# Patient Record
Sex: MALE | Race: WHITE | Employment: FULL TIME | URBAN - NONMETROPOLITAN AREA
[De-identification: names, ages, dates, MRNs, and addresses within clinical notes are randomized per-mention and may not be internally consistent; named-entity substitution may affect disease eponyms.]

---

## 2017-07-13 ENCOUNTER — OFFICE VISIT (OUTPATIENT)
Age: 22
End: 2017-07-13

## 2017-07-13 VITALS
HEART RATE: 97 BPM | SYSTOLIC BLOOD PRESSURE: 134 MMHG | RESPIRATION RATE: 18 BRPM | BODY MASS INDEX: 27.19 KG/M2 | HEIGHT: 66 IN | WEIGHT: 169.2 LBS | DIASTOLIC BLOOD PRESSURE: 66 MMHG | TEMPERATURE: 98 F | OXYGEN SATURATION: 98 %

## 2017-07-13 DIAGNOSIS — R10.31 RIGHT GROIN PAIN: ICD-10-CM

## 2017-07-13 DIAGNOSIS — K40.90 LEFT INGUINAL HERNIA: ICD-10-CM

## 2017-07-13 DIAGNOSIS — K40.90 RIGHT INGUINAL HERNIA: Primary | ICD-10-CM

## 2017-07-13 PROCEDURE — 99203 OFFICE O/P NEW LOW 30 MIN: CPT | Performed by: SURGERY

## 2017-07-13 PROCEDURE — G8419 CALC BMI OUT NRM PARAM NOF/U: HCPCS | Performed by: SURGERY

## 2017-07-13 PROCEDURE — G8427 DOCREV CUR MEDS BY ELIG CLIN: HCPCS | Performed by: SURGERY

## 2017-07-13 PROCEDURE — 1036F TOBACCO NON-USER: CPT | Performed by: SURGERY

## 2017-07-13 ASSESSMENT — ENCOUNTER SYMPTOMS
COUGH: 0
APNEA: 0
NAUSEA: 0
ABDOMINAL PAIN: 0

## 2017-07-19 ENCOUNTER — ANESTHESIA EVENT (OUTPATIENT)
Dept: OPERATING ROOM | Age: 22
End: 2017-07-19
Payer: COMMERCIAL

## 2017-07-19 ENCOUNTER — ANESTHESIA (OUTPATIENT)
Dept: OPERATING ROOM | Age: 22
End: 2017-07-19
Payer: COMMERCIAL

## 2017-07-19 ENCOUNTER — HOSPITAL ENCOUNTER (OUTPATIENT)
Age: 22
Setting detail: OUTPATIENT SURGERY
Discharge: HOME | End: 2017-07-19
Attending: SURGERY | Admitting: SURGERY
Payer: COMMERCIAL

## 2017-07-19 VITALS
HEART RATE: 104 BPM | WEIGHT: 161.8 LBS | HEIGHT: 66 IN | TEMPERATURE: 98.1 F | SYSTOLIC BLOOD PRESSURE: 139 MMHG | OXYGEN SATURATION: 98 % | RESPIRATION RATE: 17 BRPM | DIASTOLIC BLOOD PRESSURE: 78 MMHG | BODY MASS INDEX: 26 KG/M2

## 2017-07-19 VITALS
RESPIRATION RATE: 7 BRPM | OXYGEN SATURATION: 98 % | TEMPERATURE: 14.4 F | SYSTOLIC BLOOD PRESSURE: 128 MMHG | DIASTOLIC BLOOD PRESSURE: 74 MMHG

## 2017-07-19 PROCEDURE — 93005 ELECTROCARDIOGRAM TRACING: CPT

## 2017-07-19 PROCEDURE — 3700000001 HC ADD 15 MINUTES (ANESTHESIA): Performed by: SURGERY

## 2017-07-19 PROCEDURE — 7100000001 HC PACU RECOVERY - ADDTL 15 MIN: Performed by: SURGERY

## 2017-07-19 PROCEDURE — 6360000002 HC RX W HCPCS: Performed by: NURSE ANESTHETIST, CERTIFIED REGISTERED

## 2017-07-19 PROCEDURE — 2500000003 HC RX 250 WO HCPCS: Performed by: SURGERY

## 2017-07-19 PROCEDURE — 3600000009 HC SURGERY ROBOT BASE: Performed by: SURGERY

## 2017-07-19 PROCEDURE — 3700000000 HC ANESTHESIA ATTENDED CARE: Performed by: SURGERY

## 2017-07-19 PROCEDURE — 7100000011 HC PHASE II RECOVERY - ADDTL 15 MIN: Performed by: SURGERY

## 2017-07-19 PROCEDURE — 2580000003 HC RX 258: Performed by: NURSE ANESTHETIST, CERTIFIED REGISTERED

## 2017-07-19 PROCEDURE — C1781 MESH (IMPLANTABLE): HCPCS | Performed by: SURGERY

## 2017-07-19 PROCEDURE — 2500000003 HC RX 250 WO HCPCS: Performed by: NURSE ANESTHETIST, CERTIFIED REGISTERED

## 2017-07-19 PROCEDURE — 7100000000 HC PACU RECOVERY - FIRST 15 MIN: Performed by: SURGERY

## 2017-07-19 PROCEDURE — 2580000003 HC RX 258: Performed by: SURGERY

## 2017-07-19 PROCEDURE — S2900 ROBOTIC SURGICAL SYSTEM: HCPCS | Performed by: SURGERY

## 2017-07-19 PROCEDURE — 6360000002 HC RX W HCPCS: Performed by: SURGERY

## 2017-07-19 PROCEDURE — 7100000010 HC PHASE II RECOVERY - FIRST 15 MIN: Performed by: SURGERY

## 2017-07-19 PROCEDURE — 49650 LAP ING HERNIA REPAIR INIT: CPT | Performed by: SURGERY

## 2017-07-19 PROCEDURE — 3600000019 HC SURGERY ROBOT ADDTL 15MIN: Performed by: SURGERY

## 2017-07-19 DEVICE — MESH HERN W10XL15CM POLY POLYLACTIC ACID 70% CLLGN 30% GLYC: Type: IMPLANTABLE DEVICE | Site: INGUINAL | Status: FUNCTIONAL

## 2017-07-19 RX ORDER — BUPIVACAINE HYDROCHLORIDE AND EPINEPHRINE 5; 5 MG/ML; UG/ML
INJECTION, SOLUTION EPIDURAL; INTRACAUDAL; PERINEURAL PRN
Status: DISCONTINUED | OUTPATIENT
Start: 2017-07-19 | End: 2017-07-19 | Stop reason: HOSPADM

## 2017-07-19 RX ORDER — KETOROLAC TROMETHAMINE 30 MG/ML
INJECTION, SOLUTION INTRAMUSCULAR; INTRAVENOUS PRN
Status: DISCONTINUED | OUTPATIENT
Start: 2017-07-19 | End: 2017-07-19 | Stop reason: SDUPTHER

## 2017-07-19 RX ORDER — SODIUM CHLORIDE 9 MG/ML
INJECTION, SOLUTION INTRAVENOUS CONTINUOUS
Status: CANCELLED | OUTPATIENT
Start: 2017-07-19

## 2017-07-19 RX ORDER — HYDROMORPHONE HCL 110MG/55ML
PATIENT CONTROLLED ANALGESIA SYRINGE INTRAVENOUS PRN
Status: DISCONTINUED | OUTPATIENT
Start: 2017-07-19 | End: 2017-07-19 | Stop reason: SDUPTHER

## 2017-07-19 RX ORDER — PROPOFOL 10 MG/ML
INJECTION, EMULSION INTRAVENOUS PRN
Status: DISCONTINUED | OUTPATIENT
Start: 2017-07-19 | End: 2017-07-19 | Stop reason: SDUPTHER

## 2017-07-19 RX ORDER — ONDANSETRON 2 MG/ML
4 INJECTION INTRAMUSCULAR; INTRAVENOUS EVERY 6 HOURS PRN
Status: CANCELLED | OUTPATIENT
Start: 2017-07-19

## 2017-07-19 RX ORDER — DIPHENHYDRAMINE HYDROCHLORIDE 50 MG/ML
12.5 INJECTION INTRAMUSCULAR; INTRAVENOUS
Status: DISCONTINUED | OUTPATIENT
Start: 2017-07-19 | End: 2017-07-19 | Stop reason: HOSPADM

## 2017-07-19 RX ORDER — METOCLOPRAMIDE HYDROCHLORIDE 5 MG/ML
10 INJECTION INTRAMUSCULAR; INTRAVENOUS
Status: DISCONTINUED | OUTPATIENT
Start: 2017-07-19 | End: 2017-07-19 | Stop reason: HOSPADM

## 2017-07-19 RX ORDER — ONDANSETRON 2 MG/ML
INJECTION INTRAMUSCULAR; INTRAVENOUS PRN
Status: DISCONTINUED | OUTPATIENT
Start: 2017-07-19 | End: 2017-07-19 | Stop reason: SDUPTHER

## 2017-07-19 RX ORDER — SODIUM CHLORIDE 0.9 % (FLUSH) 0.9 %
10 SYRINGE (ML) INJECTION EVERY 12 HOURS SCHEDULED
Status: CANCELLED | OUTPATIENT
Start: 2017-07-19

## 2017-07-19 RX ORDER — FENTANYL CITRATE 50 UG/ML
25 INJECTION, SOLUTION INTRAMUSCULAR; INTRAVENOUS EVERY 5 MIN PRN
Status: DISCONTINUED | OUTPATIENT
Start: 2017-07-19 | End: 2017-07-19 | Stop reason: HOSPADM

## 2017-07-19 RX ORDER — SODIUM CHLORIDE 9 MG/ML
INJECTION, SOLUTION INTRAVENOUS CONTINUOUS PRN
Status: DISCONTINUED | OUTPATIENT
Start: 2017-07-19 | End: 2017-07-19 | Stop reason: SDUPTHER

## 2017-07-19 RX ORDER — SODIUM CHLORIDE 0.9 % (FLUSH) 0.9 %
10 SYRINGE (ML) INJECTION PRN
Status: CANCELLED | OUTPATIENT
Start: 2017-07-19

## 2017-07-19 RX ORDER — PROMETHAZINE HYDROCHLORIDE 25 MG/ML
12.5 INJECTION, SOLUTION INTRAMUSCULAR; INTRAVENOUS
Status: DISCONTINUED | OUTPATIENT
Start: 2017-07-19 | End: 2017-07-19 | Stop reason: HOSPADM

## 2017-07-19 RX ORDER — LIDOCAINE HYDROCHLORIDE 20 MG/ML
INJECTION, SOLUTION INFILTRATION; PERINEURAL PRN
Status: DISCONTINUED | OUTPATIENT
Start: 2017-07-19 | End: 2017-07-19 | Stop reason: SDUPTHER

## 2017-07-19 RX ORDER — NEOSTIGMINE METHYLSULFATE 1 MG/ML
INJECTION, SOLUTION INTRAVENOUS PRN
Status: DISCONTINUED | OUTPATIENT
Start: 2017-07-19 | End: 2017-07-19 | Stop reason: SDUPTHER

## 2017-07-19 RX ORDER — SODIUM CHLORIDE 9 MG/ML
INJECTION, SOLUTION INTRAVENOUS CONTINUOUS
Status: DISCONTINUED | OUTPATIENT
Start: 2017-07-19 | End: 2017-07-19 | Stop reason: HOSPADM

## 2017-07-19 RX ORDER — GLYCOPYRROLATE 0.2 MG/ML
INJECTION INTRAMUSCULAR; INTRAVENOUS PRN
Status: DISCONTINUED | OUTPATIENT
Start: 2017-07-19 | End: 2017-07-19 | Stop reason: SDUPTHER

## 2017-07-19 RX ORDER — ACETAMINOPHEN 325 MG/1
650 TABLET ORAL EVERY 4 HOURS PRN
Status: CANCELLED | OUTPATIENT
Start: 2017-07-19

## 2017-07-19 RX ORDER — HYDROCODONE BITARTRATE AND ACETAMINOPHEN 5; 325 MG/1; MG/1
2 TABLET ORAL EVERY 4 HOURS PRN
Status: CANCELLED | OUTPATIENT
Start: 2017-07-19

## 2017-07-19 RX ORDER — ROCURONIUM BROMIDE 10 MG/ML
INJECTION, SOLUTION INTRAVENOUS PRN
Status: DISCONTINUED | OUTPATIENT
Start: 2017-07-19 | End: 2017-07-19 | Stop reason: SDUPTHER

## 2017-07-19 RX ORDER — FENTANYL CITRATE 50 UG/ML
50 INJECTION, SOLUTION INTRAMUSCULAR; INTRAVENOUS EVERY 5 MIN PRN
Status: DISCONTINUED | OUTPATIENT
Start: 2017-07-19 | End: 2017-07-19 | Stop reason: HOSPADM

## 2017-07-19 RX ORDER — ACETAMINOPHEN 650 MG/1
650 SUPPOSITORY RECTAL EVERY 4 HOURS PRN
Status: CANCELLED | OUTPATIENT
Start: 2017-07-19

## 2017-07-19 RX ORDER — DEXAMETHASONE SODIUM PHOSPHATE 4 MG/ML
INJECTION, SOLUTION INTRA-ARTICULAR; INTRALESIONAL; INTRAMUSCULAR; INTRAVENOUS; SOFT TISSUE PRN
Status: DISCONTINUED | OUTPATIENT
Start: 2017-07-19 | End: 2017-07-19 | Stop reason: SDUPTHER

## 2017-07-19 RX ORDER — HYDROCODONE BITARTRATE AND ACETAMINOPHEN 5; 325 MG/1; MG/1
1 TABLET ORAL EVERY 4 HOURS PRN
Status: CANCELLED | OUTPATIENT
Start: 2017-07-19

## 2017-07-19 RX ORDER — MEPERIDINE HYDROCHLORIDE 25 MG/ML
12.5 INJECTION INTRAMUSCULAR; INTRAVENOUS; SUBCUTANEOUS EVERY 5 MIN PRN
Status: DISCONTINUED | OUTPATIENT
Start: 2017-07-19 | End: 2017-07-19 | Stop reason: HOSPADM

## 2017-07-19 RX ORDER — HYDROCODONE BITARTRATE AND ACETAMINOPHEN 7.5; 325 MG/1; MG/1
1 TABLET ORAL EVERY 6 HOURS PRN
Qty: 40 TABLET | Refills: 0 | Status: SHIPPED | OUTPATIENT
Start: 2017-07-19 | End: 2017-07-26

## 2017-07-19 RX ORDER — MIDAZOLAM HYDROCHLORIDE 1 MG/ML
INJECTION INTRAMUSCULAR; INTRAVENOUS PRN
Status: DISCONTINUED | OUTPATIENT
Start: 2017-07-19 | End: 2017-07-19 | Stop reason: SDUPTHER

## 2017-07-19 RX ORDER — ESMOLOL HYDROCHLORIDE 10 MG/ML
INJECTION INTRAVENOUS PRN
Status: DISCONTINUED | OUTPATIENT
Start: 2017-07-19 | End: 2017-07-19 | Stop reason: SDUPTHER

## 2017-07-19 RX ORDER — LABETALOL HYDROCHLORIDE 5 MG/ML
5 INJECTION, SOLUTION INTRAVENOUS EVERY 10 MIN PRN
Status: DISCONTINUED | OUTPATIENT
Start: 2017-07-19 | End: 2017-07-19 | Stop reason: HOSPADM

## 2017-07-19 RX ADMIN — LIDOCAINE HYDROCHLORIDE 60 MG: 20 INJECTION, SOLUTION INFILTRATION; PERINEURAL at 15:35

## 2017-07-19 RX ADMIN — HYDROMORPHONE HYDROCHLORIDE 0.5 MG: 2 INJECTION INTRAMUSCULAR; INTRAVENOUS; SUBCUTANEOUS at 15:29

## 2017-07-19 RX ADMIN — SODIUM CHLORIDE: 9 INJECTION, SOLUTION INTRAVENOUS at 12:24

## 2017-07-19 RX ADMIN — Medication 5 MG: at 15:23

## 2017-07-19 RX ADMIN — Medication 40 MG: at 14:23

## 2017-07-19 RX ADMIN — CEFAZOLIN SODIUM 2 G: 2 SOLUTION INTRAVENOUS at 14:23

## 2017-07-19 RX ADMIN — DEXAMETHASONE SODIUM PHOSPHATE 4 MG: 4 INJECTION, SOLUTION INTRAMUSCULAR; INTRAVENOUS at 14:23

## 2017-07-19 RX ADMIN — LIDOCAINE HYDROCHLORIDE 40 MG: 20 INJECTION, SOLUTION INFILTRATION; PERINEURAL at 14:23

## 2017-07-19 RX ADMIN — HYDROMORPHONE HYDROCHLORIDE 1 MG: 2 INJECTION INTRAMUSCULAR; INTRAVENOUS; SUBCUTANEOUS at 14:23

## 2017-07-19 RX ADMIN — GLYCOPYRROLATE 0.4 MG: 0.2 INJECTION, SOLUTION INTRAMUSCULAR; INTRAVENOUS at 15:37

## 2017-07-19 RX ADMIN — SODIUM CHLORIDE: 9 INJECTION, SOLUTION INTRAVENOUS at 14:23

## 2017-07-19 RX ADMIN — ESMOLOL HYDROCHLORIDE 20 MG: 10 INJECTION, SOLUTION INTRAVENOUS at 15:29

## 2017-07-19 RX ADMIN — MIDAZOLAM HYDROCHLORIDE 2 MG: 1 INJECTION INTRAMUSCULAR; INTRAVENOUS at 14:15

## 2017-07-19 RX ADMIN — KETOROLAC TROMETHAMINE 30 MG: 30 INJECTION, SOLUTION INTRAMUSCULAR; INTRAVENOUS at 15:26

## 2017-07-19 RX ADMIN — PROPOFOL 230 MG: 10 INJECTION, EMULSION INTRAVENOUS at 14:23

## 2017-07-19 RX ADMIN — SODIUM CHLORIDE: 9 INJECTION, SOLUTION INTRAVENOUS at 15:45

## 2017-07-19 RX ADMIN — ONDANSETRON 4 MG: 2 INJECTION INTRAMUSCULAR; INTRAVENOUS at 15:26

## 2017-07-19 RX ADMIN — ESMOLOL HYDROCHLORIDE 30 MG: 10 INJECTION, SOLUTION INTRAVENOUS at 14:37

## 2017-07-19 RX ADMIN — NEOSTIGMINE METHYLSULFATE 3 MG: 1 INJECTION, SOLUTION INTRAVENOUS at 15:37

## 2017-07-19 RX ADMIN — Medication 10 MG: at 15:00

## 2017-07-19 ASSESSMENT — PULMONARY FUNCTION TESTS
PIF_VALUE: 22
PIF_VALUE: 3
PIF_VALUE: 22
PIF_VALUE: 22
PIF_VALUE: 23
PIF_VALUE: 16
PIF_VALUE: 21
PIF_VALUE: 22
PIF_VALUE: 22
PIF_VALUE: 7
PIF_VALUE: 20
PIF_VALUE: 22
PIF_VALUE: 21
PIF_VALUE: 20
PIF_VALUE: 22
PIF_VALUE: 7
PIF_VALUE: 18
PIF_VALUE: 20
PIF_VALUE: 21
PIF_VALUE: 22
PIF_VALUE: 1
PIF_VALUE: 20
PIF_VALUE: 20
PIF_VALUE: 21
PIF_VALUE: 3
PIF_VALUE: 21
PIF_VALUE: 20
PIF_VALUE: 2
PIF_VALUE: 12
PIF_VALUE: 20
PIF_VALUE: 3
PIF_VALUE: 20
PIF_VALUE: 16
PIF_VALUE: 22
PIF_VALUE: 22
PIF_VALUE: 20
PIF_VALUE: 0
PIF_VALUE: 22
PIF_VALUE: 22
PIF_VALUE: 21
PIF_VALUE: 20
PIF_VALUE: 20
PIF_VALUE: 19
PIF_VALUE: 20
PIF_VALUE: 12
PIF_VALUE: 1
PIF_VALUE: 22
PIF_VALUE: 21
PIF_VALUE: 22
PIF_VALUE: 13
PIF_VALUE: 22
PIF_VALUE: 1
PIF_VALUE: 12
PIF_VALUE: 22
PIF_VALUE: 3
PIF_VALUE: 13
PIF_VALUE: 22
PIF_VALUE: 22
PIF_VALUE: 21
PIF_VALUE: 3
PIF_VALUE: 17
PIF_VALUE: 17
PIF_VALUE: 20
PIF_VALUE: 22
PIF_VALUE: 21
PIF_VALUE: 21
PIF_VALUE: 20
PIF_VALUE: 13
PIF_VALUE: 2
PIF_VALUE: 22
PIF_VALUE: 20
PIF_VALUE: 22
PIF_VALUE: 13
PIF_VALUE: 21
PIF_VALUE: 1
PIF_VALUE: 4
PIF_VALUE: 5
PIF_VALUE: 20
PIF_VALUE: 4
PIF_VALUE: 22
PIF_VALUE: 20
PIF_VALUE: 22
PIF_VALUE: 22
PIF_VALUE: 16
PIF_VALUE: 17

## 2017-07-19 ASSESSMENT — PAIN SCALES - GENERAL
PAINLEVEL_OUTOF10: 3
PAINLEVEL_OUTOF10: 5
PAINLEVEL_OUTOF10: 1

## 2017-07-20 ENCOUNTER — TELEPHONE (OUTPATIENT)
Dept: SURGERY | Age: 22
End: 2017-07-20

## 2017-07-20 LAB
EKG ATRIAL RATE: 64 BPM
EKG P AXIS: -24 DEGREES
EKG P-R INTERVAL: 114 MS
EKG Q-T INTERVAL: 408 MS
EKG QRS DURATION: 96 MS
EKG QTC CALCULATION (BAZETT): 420 MS
EKG R AXIS: 71 DEGREES
EKG T AXIS: 64 DEGREES
EKG VENTRICULAR RATE: 64 BPM

## 2017-08-07 ENCOUNTER — OFFICE VISIT (OUTPATIENT)
Dept: SURGERY | Age: 22
End: 2017-08-07

## 2017-08-07 VITALS
OXYGEN SATURATION: 96 % | HEIGHT: 66 IN | HEART RATE: 100 BPM | TEMPERATURE: 97.6 F | BODY MASS INDEX: 26.52 KG/M2 | DIASTOLIC BLOOD PRESSURE: 64 MMHG | SYSTOLIC BLOOD PRESSURE: 120 MMHG | WEIGHT: 165 LBS | RESPIRATION RATE: 16 BRPM

## 2017-08-07 DIAGNOSIS — Z98.890 POSTOPERATIVE STATE: ICD-10-CM

## 2017-08-07 DIAGNOSIS — K40.90 RIGHT INGUINAL HERNIA: Primary | ICD-10-CM

## 2017-08-07 PROCEDURE — 99024 POSTOP FOLLOW-UP VISIT: CPT | Performed by: SURGERY

## 2017-10-28 ENCOUNTER — HOSPITAL ENCOUNTER (EMERGENCY)
Age: 22
Discharge: HOME | End: 2017-10-28
Attending: FAMILY MEDICINE
Payer: COMMERCIAL

## 2017-10-28 VITALS
OXYGEN SATURATION: 98 % | HEART RATE: 78 BPM | TEMPERATURE: 98.2 F | DIASTOLIC BLOOD PRESSURE: 89 MMHG | SYSTOLIC BLOOD PRESSURE: 142 MMHG | BODY MASS INDEX: 24.25 KG/M2 | HEIGHT: 68 IN | RESPIRATION RATE: 18 BRPM | WEIGHT: 160 LBS

## 2017-10-28 DIAGNOSIS — R07.89 CHEST TIGHTNESS OR PRESSURE: ICD-10-CM

## 2017-10-28 DIAGNOSIS — R00.2 PALPITATIONS: Primary | ICD-10-CM

## 2017-10-28 LAB
ALBUMIN SERPL-MCNC: 4.6 G/DL (ref 3.5–5.1)
ALP BLD-CCNC: 48 U/L (ref 38–126)
ALT SERPL-CCNC: 47 U/L (ref 11–66)
AMPHETAMINE+METHAMPHETAMINE URINE SCREEN: NEGATIVE
ANION GAP SERPL CALCULATED.3IONS-SCNC: 15 MEQ/L (ref 8–16)
AST SERPL-CCNC: 45 U/L (ref 5–40)
BARBITURATE QUANTITATIVE URINE: NEGATIVE
BASOPHILS # BLD: 0.7 %
BASOPHILS ABSOLUTE: 0.1 THOU/MM3 (ref 0–0.1)
BENZODIAZEPINE QUANTITATIVE URINE: NEGATIVE
BILIRUB SERPL-MCNC: 0.2 MG/DL (ref 0.3–1.2)
BUN BLDV-MCNC: 15 MG/DL (ref 7–22)
CALCIUM SERPL-MCNC: 9.1 MG/DL (ref 8.5–10.5)
CANNABINOID QUANTITATIVE URINE: NEGATIVE
CHLORIDE BLD-SCNC: 101 MEQ/L (ref 98–111)
CO2: 22 MEQ/L (ref 23–33)
COCAINE METABOLITE QUANTITATIVE URINE: NEGATIVE
CREAT SERPL-MCNC: 0.8 MG/DL (ref 0.4–1.2)
EKG ATRIAL RATE: 82 BPM
EKG P AXIS: 75 DEGREES
EKG P-R INTERVAL: 130 MS
EKG Q-T INTERVAL: 364 MS
EKG QRS DURATION: 98 MS
EKG QTC CALCULATION (BAZETT): 425 MS
EKG R AXIS: 71 DEGREES
EKG T AXIS: 74 DEGREES
EKG VENTRICULAR RATE: 82 BPM
EOSINOPHIL # BLD: 0.9 %
EOSINOPHILS ABSOLUTE: 0.1 THOU/MM3 (ref 0–0.4)
GFR SERPL CREATININE-BSD FRML MDRD: > 90 ML/MIN/1.73M2
GLUCOSE BLD-MCNC: 102 MG/DL (ref 70–108)
HCT VFR BLD CALC: 48.1 % (ref 42–52)
HEMOGLOBIN: 16.4 GM/DL (ref 14–18)
LYMPHOCYTES # BLD: 18.7 %
LYMPHOCYTES ABSOLUTE: 1.6 THOU/MM3 (ref 1–4.8)
MAGNESIUM: 2.1 MG/DL (ref 1.6–2.4)
MCH RBC QN AUTO: 30.4 PG (ref 27–31)
MCHC RBC AUTO-ENTMCNC: 34.1 GM/DL (ref 33–37)
MCV RBC AUTO: 89.4 FL (ref 80–94)
MONOCYTES # BLD: 7.4 %
MONOCYTES ABSOLUTE: 0.6 THOU/MM3 (ref 0.4–1.3)
NUCLEATED RED BLOOD CELLS: 0 /100 WBC
OPIATES, URINE: NEGATIVE
OSMOLALITY CALCULATION: 276.7 MOSMOL/KG (ref 275–300)
OXYCODONE: NEGATIVE
PDW BLD-RTO: 13.2 % (ref 11.5–14.5)
PHENCYCLIDINE QUANTITATIVE URINE: NEGATIVE
PLATELET # BLD: 230 THOU/MM3 (ref 130–400)
PMV BLD AUTO: 7.6 MCM (ref 7.4–10.4)
POTASSIUM SERPL-SCNC: 3.5 MEQ/L (ref 3.5–5.2)
RBC # BLD: 5.38 MILL/MM3 (ref 4.7–6.1)
SEG NEUTROPHILS: 72.3 %
SEGMENTED NEUTROPHILS ABSOLUTE COUNT: 6.1 THOU/MM3 (ref 1.8–7.7)
SODIUM BLD-SCNC: 138 MEQ/L (ref 135–145)
TOTAL PROTEIN: 7.2 G/DL (ref 6.1–8)
TROPONIN T: < 0.01 NG/ML
TSH SERPL DL<=0.05 MIU/L-ACNC: 1.01 UIU/ML (ref 0.4–4.2)
WBC # BLD: 8.4 THOU/MM3 (ref 4.8–10.8)

## 2017-10-28 PROCEDURE — 83735 ASSAY OF MAGNESIUM: CPT

## 2017-10-28 PROCEDURE — 93005 ELECTROCARDIOGRAM TRACING: CPT

## 2017-10-28 PROCEDURE — 99285 EMERGENCY DEPT VISIT HI MDM: CPT

## 2017-10-28 PROCEDURE — 80050 GENERAL HEALTH PANEL: CPT

## 2017-10-28 PROCEDURE — 80307 DRUG TEST PRSMV CHEM ANLYZR: CPT

## 2017-10-28 PROCEDURE — 36415 COLL VENOUS BLD VENIPUNCTURE: CPT

## 2017-10-28 PROCEDURE — 84484 ASSAY OF TROPONIN QUANT: CPT

## 2017-10-28 ASSESSMENT — ENCOUNTER SYMPTOMS
WHEEZING: 0
APNEA: 0
ABDOMINAL PAIN: 0
DIARRHEA: 0
VOMITING: 0
BACK PAIN: 0
BLOOD IN STOOL: 0
COLOR CHANGE: 0
CONSTIPATION: 0
STRIDOR: 0
CHEST TIGHTNESS: 1
COUGH: 0
CHOKING: 0
SHORTNESS OF BREATH: 0
ANAL BLEEDING: 0
ABDOMINAL DISTENTION: 0
NAUSEA: 0

## 2017-10-28 ASSESSMENT — PAIN DESCRIPTION - FREQUENCY: FREQUENCY: CONTINUOUS

## 2017-10-28 ASSESSMENT — PAIN DESCRIPTION - ORIENTATION: ORIENTATION: LEFT

## 2017-10-28 ASSESSMENT — PAIN SCALES - GENERAL: PAINLEVEL_OUTOF10: 10

## 2017-10-28 ASSESSMENT — PAIN DESCRIPTION - PAIN TYPE: TYPE: ACUTE PAIN

## 2017-10-28 ASSESSMENT — PAIN DESCRIPTION - LOCATION: LOCATION: CHEST

## 2017-10-28 NOTE — ED NOTES
Bed: 008A  Expected date: 10/28/17  Expected time:   Means of arrival:   Comments:  Verito, chest pain     Jeronimo Steel  10/28/17 1812

## 2017-10-28 NOTE — ED PROVIDER NOTES
[facility]  eMERGENCY dEPARTMENT eNCOUnter          CHIEF COMPLAINT       Chief Complaint   Patient presents with    Chest Pain       Nurses Notes reviewed and I agree except as noted in the HPI. HISTORY OF PRESENT ILLNESS    Jackie Boswell is a 25 y.o. male who presents to the Emergency Department for the evaluation of palpitations and chest tightness. Patient reports a history of Ida-Parkinson-White and SVT status post ablation 2014. Reports that about hour and a half ago, he began to experience some chest tightness and discomfort described as a pressure sensation in the center of his chest that is nonradiating. Denies any nausea, vomiting, dizziness, lightheadedness or diaphoresis. He does report palpitations that occurred constantly since symptom onset. He reports irregular heartbeat and feeling pounding pulse. Denies any headaches, vision changes. He does report fatigue. Patient is employed in construction. He states that he had a heart monitor on back in July of this year for about 30 days and was found to be normal. He is scheduled for a stress test, but did not have that completed. Patient states drinking 3-4 beers today. He does report chewing tobacco, but denies any cigarette use. Denies any recreational drug use. Denies any shortness of breath or difficulty breathing. No new medication changes. No other complaints concerns as time. The HPI was provided by the patient. REVIEW OF SYSTEMS     Review of Systems   Constitutional: Negative for chills, diaphoresis, fatigue and fever. Respiratory: Positive for chest tightness. Negative for apnea, cough, choking, shortness of breath, wheezing and stridor. Cardiovascular: Positive for palpitations. Negative for chest pain and leg swelling. Gastrointestinal: Negative for abdominal distention, abdominal pain, anal bleeding, blood in stool, constipation, diarrhea, nausea and vomiting.    Musculoskeletal: Negative for back pain and Pupils are equal, round, and reactive to light. Neck: Normal range of motion. Neck supple. No JVD present. No tracheal deviation present. No thyromegaly present. Cardiovascular: Normal rate, regular rhythm, normal heart sounds and intact distal pulses. Exam reveals no gallop and no friction rub. No murmur heard. Pulmonary/Chest: Effort normal and breath sounds normal. No stridor. No respiratory distress. He has no wheezes. He has no rales. He exhibits no tenderness. Abdominal: Soft. Bowel sounds are normal. He exhibits no distension and no mass. There is no tenderness. There is no rebound and no guarding. Lymphadenopathy:     He has no cervical adenopathy. Neurological: He is alert and oriented to person, place, and time. Skin: Skin is warm. He is not diaphoretic. DIFFERENTIAL DIAGNOSIS:   Sinus tachycardia, WPW, SVT, hyperthyroidism, medication side effect, drug toxicity    DIAGNOSTIC RESULTS     EKG: All EKG's are interpreted by the Emergency Department Physician who either signs or Co-signs this chart in the absence of a cardiologist.  EKG interpreted by Chinyere Burnett MD:    EKG shows normal sinus rhythm with sinus arrhythmia, no acute changes compared to previous EKG. Ventricular rate 82 bpm, MT interval 130 milliseconds, QRS duration 98 ms,  ms.      RADIOLOGY: non-plain film images(s) such as CT, Ultrasound and MRI are read by the radiologist.    No orders to display       LABS:   Labs Reviewed   COMPREHENSIVE METABOLIC PANEL - Abnormal; Notable for the following:        Result Value    CO2 22 (*)     AST 45 (*)     Total Bilirubin 0.2 (*)     All other components within normal limits   CBC WITH AUTO DIFFERENTIAL   TROPONIN   MAGNESIUM   TSH WITHOUT REFLEX   URINE DRUG SCREEN   ANION GAP   GLOMERULAR FILTRATION RATE, ESTIMATED   OSMOLALITY       EMERGENCY DEPARTMENT COURSE:   Vitals:    Vitals:    10/28/17 1817 10/28/17 1925   BP: (!) 158/95 (!) 142/89   Pulse: 79 78   Resp:

## 2017-11-02 ENCOUNTER — OFFICE VISIT (OUTPATIENT)
Dept: SURGERY | Age: 22
End: 2017-11-02
Payer: COMMERCIAL

## 2017-11-02 VITALS
RESPIRATION RATE: 16 BRPM | WEIGHT: 164.8 LBS | HEART RATE: 83 BPM | BODY MASS INDEX: 24.98 KG/M2 | TEMPERATURE: 97.9 F | OXYGEN SATURATION: 97 % | HEIGHT: 68 IN | SYSTOLIC BLOOD PRESSURE: 136 MMHG | DIASTOLIC BLOOD PRESSURE: 78 MMHG

## 2017-11-02 DIAGNOSIS — Z86.79 HISTORY OF WOLFF-PARKINSON-WHITE (WPW) SYNDROME: ICD-10-CM

## 2017-11-02 DIAGNOSIS — K40.90 LEFT INGUINAL HERNIA: Primary | ICD-10-CM

## 2017-11-02 PROCEDURE — G8417 CALC BMI ABV UP PARAM F/U: HCPCS | Performed by: SURGERY

## 2017-11-02 PROCEDURE — 4004F PT TOBACCO SCREEN RCVD TLK: CPT | Performed by: SURGERY

## 2017-11-02 PROCEDURE — 99214 OFFICE O/P EST MOD 30 MIN: CPT | Performed by: SURGERY

## 2017-11-02 PROCEDURE — G8484 FLU IMMUNIZE NO ADMIN: HCPCS | Performed by: SURGERY

## 2017-11-02 PROCEDURE — G8427 DOCREV CUR MEDS BY ELIG CLIN: HCPCS | Performed by: SURGERY

## 2017-11-02 ASSESSMENT — ENCOUNTER SYMPTOMS
CHOKING: 0
ABDOMINAL PAIN: 0
SORE THROAT: 0
EYE PAIN: 0
SHORTNESS OF BREATH: 0
TROUBLE SWALLOWING: 0
COUGH: 0
BACK PAIN: 0
EYE ITCHING: 0
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
## 2017-11-02 NOTE — PATIENT INSTRUCTIONS
Surgery scheduled 12/11/17. Arrive to [address] [place] [address]. [facility] 2nd floor registration desk at 6 am. Nothing to eat or drink after midnight.  Bring a

## 2017-11-09 ENCOUNTER — TELEPHONE (OUTPATIENT)
Dept: SURGERY | Age: 22
End: 2017-11-09

## 2017-11-29 NOTE — PROGRESS NOTES
In preparation for their surgical procedure above patient was screened for Obstructive Sleep Apnea (ALEXY) using the STOP-Bang Questionnaire by the Pre-Admission Testing department. This is a pre-surgical screening tool for patient safety and serves as a recommendation, this WILL NOT cause cancellation of surgery. STOP-Bang Questionnaire  * Do you currently see a pulmonologist?  No     If yes STOP, do not complete. Patient follows with     1. Do you snore loudly (able to be heard in the next room)? No    2. Do you often feel tired or sleepy during the daytime? No       3. Has anyone ever told you that you stop breathing during your sleep? No    4. Do you have or are you being treated for high blood pressure? No      5. BMI more than 35? BMI (Calculated): 23.7        No    6. Age over 48 years? 25 y.o. No    7. Neck Circumference greater than 17 inches for male or 16 inches for female? Measured           (visits only)            Not Applicable    8. Gender Male? Yes      TOTAL SCORE: 1    ALEXY - Low Risk : Yes to 0 - 2 questions  ALEXY - Intermediate Risk : Yes to 3 - 4 questions  ALEXY - High Risk : Yes to 5 - 8 questions    Adapted from:   STOP Questionnaire: A Tool to Screen Patients for Obstructive Sleep Apnea   KULDEEP NewbyC.P.C., Nikhil Simms M.B.B.S., Ulises Solomon M.D., Mary Lee. Kerwin Tolliver, Ph.D., KASHIF Arguello.B.B.S., Patti Chino M.Sc., Capo Jimenez M.D., Ismael Chauhan. JACOBY LegerP.C.    Anesthesiology 2008; [id number]:446-39 Copyright 2008, the [address] of Anesthesiologists, [facility] 37.   ----------------------------------------------------------------------------------------------------------------

## 2017-12-06 ENCOUNTER — ANESTHESIA EVENT (OUTPATIENT)
Dept: OPERATING ROOM | Age: 22
End: 2017-12-06
Payer: COMMERCIAL

## 2017-12-06 ENCOUNTER — HOSPITAL ENCOUNTER (OUTPATIENT)
Age: 22
Setting detail: OUTPATIENT SURGERY
Discharge: HOME | End: 2017-12-06
Attending: SURGERY | Admitting: SURGERY
Payer: COMMERCIAL

## 2017-12-06 ENCOUNTER — ANESTHESIA (OUTPATIENT)
Dept: OPERATING ROOM | Age: 22
End: 2017-12-06
Payer: COMMERCIAL

## 2017-12-06 VITALS
SYSTOLIC BLOOD PRESSURE: 127 MMHG | OXYGEN SATURATION: 97 % | HEART RATE: 80 BPM | WEIGHT: 168.43 LBS | RESPIRATION RATE: 12 BRPM | HEIGHT: 70 IN | BODY MASS INDEX: 24.11 KG/M2 | DIASTOLIC BLOOD PRESSURE: 80 MMHG | TEMPERATURE: 98 F

## 2017-12-06 VITALS
OXYGEN SATURATION: 95 % | TEMPERATURE: 96.6 F | RESPIRATION RATE: 10 BRPM | DIASTOLIC BLOOD PRESSURE: 71 MMHG | SYSTOLIC BLOOD PRESSURE: 118 MMHG

## 2017-12-06 PROBLEM — K40.90 LEFT INGUINAL HERNIA: Status: ACTIVE | Noted: 2017-12-06

## 2017-12-06 PROCEDURE — 2580000003 HC RX 258: Performed by: SURGERY

## 2017-12-06 PROCEDURE — 3700000001 HC ADD 15 MINUTES (ANESTHESIA): Performed by: SURGERY

## 2017-12-06 PROCEDURE — 3600000019 HC SURGERY ROBOT ADDTL 15MIN: Performed by: SURGERY

## 2017-12-06 PROCEDURE — 7100000001 HC PACU RECOVERY - ADDTL 15 MIN: Performed by: SURGERY

## 2017-12-06 PROCEDURE — 6360000002 HC RX W HCPCS: Performed by: ANESTHESIOLOGY

## 2017-12-06 PROCEDURE — 7100000000 HC PACU RECOVERY - FIRST 15 MIN: Performed by: SURGERY

## 2017-12-06 PROCEDURE — 3600000009 HC SURGERY ROBOT BASE: Performed by: SURGERY

## 2017-12-06 PROCEDURE — 7100000010 HC PHASE II RECOVERY - FIRST 15 MIN: Performed by: SURGERY

## 2017-12-06 PROCEDURE — 6370000000 HC RX 637 (ALT 250 FOR IP): Performed by: SURGERY

## 2017-12-06 PROCEDURE — 7100000011 HC PHASE II RECOVERY - ADDTL 15 MIN: Performed by: SURGERY

## 2017-12-06 PROCEDURE — 2500000003 HC RX 250 WO HCPCS: Performed by: NURSE ANESTHETIST, CERTIFIED REGISTERED

## 2017-12-06 PROCEDURE — 49650 LAP ING HERNIA REPAIR INIT: CPT | Performed by: SURGERY

## 2017-12-06 PROCEDURE — S2900 ROBOTIC SURGICAL SYSTEM: HCPCS | Performed by: SURGERY

## 2017-12-06 PROCEDURE — 6360000002 HC RX W HCPCS: Performed by: NURSE ANESTHETIST, CERTIFIED REGISTERED

## 2017-12-06 PROCEDURE — 3700000000 HC ANESTHESIA ATTENDED CARE: Performed by: SURGERY

## 2017-12-06 PROCEDURE — 2500000003 HC RX 250 WO HCPCS: Performed by: SURGERY

## 2017-12-06 PROCEDURE — C1781 MESH (IMPLANTABLE): HCPCS | Performed by: SURGERY

## 2017-12-06 DEVICE — MESH HERN W10XL15CM POLY POLYLACTIC ACID 70% CLLGN 30% GLYC: Type: IMPLANTABLE DEVICE | Site: ABDOMEN | Status: FUNCTIONAL

## 2017-12-06 RX ORDER — LIDOCAINE HYDROCHLORIDE 20 MG/ML
INJECTION, SOLUTION INFILTRATION; PERINEURAL PRN
Status: DISCONTINUED | OUTPATIENT
Start: 2017-12-06 | End: 2017-12-06 | Stop reason: SDUPTHER

## 2017-12-06 RX ORDER — HYDROCODONE BITARTRATE AND ACETAMINOPHEN 5; 325 MG/1; MG/1
1 TABLET ORAL EVERY 4 HOURS PRN
Status: DISCONTINUED | OUTPATIENT
Start: 2017-12-06 | End: 2017-12-06 | Stop reason: HOSPADM

## 2017-12-06 RX ORDER — SODIUM CHLORIDE 9 MG/ML
INJECTION, SOLUTION INTRAVENOUS CONTINUOUS
Status: DISCONTINUED | OUTPATIENT
Start: 2017-12-06 | End: 2017-12-06 | Stop reason: HOSPADM

## 2017-12-06 RX ORDER — MEPERIDINE HYDROCHLORIDE 25 MG/ML
12.5 INJECTION INTRAMUSCULAR; INTRAVENOUS; SUBCUTANEOUS EVERY 5 MIN PRN
Status: DISCONTINUED | OUTPATIENT
Start: 2017-12-06 | End: 2017-12-06 | Stop reason: HOSPADM

## 2017-12-06 RX ORDER — FENTANYL CITRATE 50 UG/ML
50 INJECTION, SOLUTION INTRAMUSCULAR; INTRAVENOUS EVERY 5 MIN PRN
Status: DISCONTINUED | OUTPATIENT
Start: 2017-12-06 | End: 2017-12-06 | Stop reason: HOSPADM

## 2017-12-06 RX ORDER — HYDROCODONE BITARTRATE AND ACETAMINOPHEN 5; 325 MG/1; MG/1
1 TABLET ORAL EVERY 4 HOURS PRN
Qty: 42 TABLET | Refills: 0 | Status: SHIPPED | OUTPATIENT
Start: 2017-12-06 | End: 2017-12-13

## 2017-12-06 RX ORDER — FENTANYL CITRATE 50 UG/ML
INJECTION, SOLUTION INTRAMUSCULAR; INTRAVENOUS PRN
Status: DISCONTINUED | OUTPATIENT
Start: 2017-12-06 | End: 2017-12-06 | Stop reason: SDUPTHER

## 2017-12-06 RX ORDER — MIDAZOLAM HYDROCHLORIDE 1 MG/ML
INJECTION INTRAMUSCULAR; INTRAVENOUS PRN
Status: DISCONTINUED | OUTPATIENT
Start: 2017-12-06 | End: 2017-12-06 | Stop reason: SDUPTHER

## 2017-12-06 RX ORDER — NEOSTIGMINE METHYLSULFATE 1 MG/ML
INJECTION, SOLUTION INTRAVENOUS PRN
Status: DISCONTINUED | OUTPATIENT
Start: 2017-12-06 | End: 2017-12-06 | Stop reason: SDUPTHER

## 2017-12-06 RX ORDER — GLYCOPYRROLATE 0.2 MG/ML
INJECTION INTRAMUSCULAR; INTRAVENOUS PRN
Status: DISCONTINUED | OUTPATIENT
Start: 2017-12-06 | End: 2017-12-06 | Stop reason: SDUPTHER

## 2017-12-06 RX ORDER — KETOROLAC TROMETHAMINE 30 MG/ML
30 INJECTION, SOLUTION INTRAMUSCULAR; INTRAVENOUS EVERY 6 HOURS
Status: DISCONTINUED | OUTPATIENT
Start: 2017-12-06 | End: 2017-12-06 | Stop reason: HOSPADM

## 2017-12-06 RX ORDER — ONDANSETRON 2 MG/ML
4 INJECTION INTRAMUSCULAR; INTRAVENOUS EVERY 6 HOURS PRN
Status: DISCONTINUED | OUTPATIENT
Start: 2017-12-06 | End: 2017-12-06 | Stop reason: HOSPADM

## 2017-12-06 RX ORDER — ONDANSETRON 2 MG/ML
4 INJECTION INTRAMUSCULAR; INTRAVENOUS
Status: DISCONTINUED | OUTPATIENT
Start: 2017-12-06 | End: 2017-12-06 | Stop reason: HOSPADM

## 2017-12-06 RX ORDER — ROCURONIUM BROMIDE 10 MG/ML
INJECTION, SOLUTION INTRAVENOUS PRN
Status: DISCONTINUED | OUTPATIENT
Start: 2017-12-06 | End: 2017-12-06 | Stop reason: SDUPTHER

## 2017-12-06 RX ORDER — SODIUM CHLORIDE 0.9 % (FLUSH) 0.9 %
10 SYRINGE (ML) INJECTION EVERY 12 HOURS SCHEDULED
Status: DISCONTINUED | OUTPATIENT
Start: 2017-12-06 | End: 2017-12-06 | Stop reason: HOSPADM

## 2017-12-06 RX ORDER — BUPIVACAINE HYDROCHLORIDE AND EPINEPHRINE 5; 5 MG/ML; UG/ML
INJECTION, SOLUTION EPIDURAL; INTRACAUDAL; PERINEURAL PRN
Status: DISCONTINUED | OUTPATIENT
Start: 2017-12-06 | End: 2017-12-06 | Stop reason: HOSPADM

## 2017-12-06 RX ORDER — SODIUM CHLORIDE 0.9 % (FLUSH) 0.9 %
10 SYRINGE (ML) INJECTION PRN
Status: DISCONTINUED | OUTPATIENT
Start: 2017-12-06 | End: 2017-12-06 | Stop reason: HOSPADM

## 2017-12-06 RX ORDER — HYDROCODONE BITARTRATE AND ACETAMINOPHEN 5; 325 MG/1; MG/1
2 TABLET ORAL EVERY 4 HOURS PRN
Status: DISCONTINUED | OUTPATIENT
Start: 2017-12-06 | End: 2017-12-06 | Stop reason: HOSPADM

## 2017-12-06 RX ORDER — ONDANSETRON 2 MG/ML
INJECTION INTRAMUSCULAR; INTRAVENOUS PRN
Status: DISCONTINUED | OUTPATIENT
Start: 2017-12-06 | End: 2017-12-06 | Stop reason: SDUPTHER

## 2017-12-06 RX ORDER — PROPOFOL 10 MG/ML
INJECTION, EMULSION INTRAVENOUS PRN
Status: DISCONTINUED | OUTPATIENT
Start: 2017-12-06 | End: 2017-12-06 | Stop reason: SDUPTHER

## 2017-12-06 RX ORDER — LABETALOL HYDROCHLORIDE 5 MG/ML
5 INJECTION, SOLUTION INTRAVENOUS EVERY 10 MIN PRN
Status: DISCONTINUED | OUTPATIENT
Start: 2017-12-06 | End: 2017-12-06 | Stop reason: HOSPADM

## 2017-12-06 RX ORDER — ACETAMINOPHEN 325 MG/1
650 TABLET ORAL EVERY 4 HOURS PRN
Status: DISCONTINUED | OUTPATIENT
Start: 2017-12-06 | End: 2017-12-06 | Stop reason: HOSPADM

## 2017-12-06 RX ORDER — DEXAMETHASONE SODIUM PHOSPHATE 4 MG/ML
INJECTION, SOLUTION INTRA-ARTICULAR; INTRALESIONAL; INTRAMUSCULAR; INTRAVENOUS; SOFT TISSUE PRN
Status: DISCONTINUED | OUTPATIENT
Start: 2017-12-06 | End: 2017-12-06 | Stop reason: SDUPTHER

## 2017-12-06 RX ORDER — ACETAMINOPHEN 650 MG/1
650 SUPPOSITORY RECTAL EVERY 4 HOURS PRN
Status: DISCONTINUED | OUTPATIENT
Start: 2017-12-06 | End: 2017-12-06 | Stop reason: HOSPADM

## 2017-12-06 RX ADMIN — HYDROMORPHONE HYDROCHLORIDE 0.25 MG: 1 INJECTION, SOLUTION INTRAMUSCULAR; INTRAVENOUS; SUBCUTANEOUS at 09:24

## 2017-12-06 RX ADMIN — HYDROCODONE BITARTRATE AND ACETAMINOPHEN 1 TABLET: 5; 325 TABLET ORAL at 10:49

## 2017-12-06 RX ADMIN — HYDROMORPHONE HYDROCHLORIDE 0.25 MG: 1 INJECTION, SOLUTION INTRAMUSCULAR; INTRAVENOUS; SUBCUTANEOUS at 09:39

## 2017-12-06 RX ADMIN — LIDOCAINE HYDROCHLORIDE 100 MG: 20 INJECTION, SOLUTION EPIDURAL; INFILTRATION; INTRACAUDAL; PERINEURAL at 07:40

## 2017-12-06 RX ADMIN — FENTANYL CITRATE 50 MCG: 50 INJECTION, SOLUTION INTRAMUSCULAR; INTRAVENOUS at 07:55

## 2017-12-06 RX ADMIN — FENTANYL CITRATE 50 MCG: 50 INJECTION, SOLUTION INTRAMUSCULAR; INTRAVENOUS at 08:30

## 2017-12-06 RX ADMIN — PROPOFOL 200 MG: 10 INJECTION, EMULSION INTRAVENOUS at 07:40

## 2017-12-06 RX ADMIN — SODIUM CHLORIDE: 9 INJECTION, SOLUTION INTRAVENOUS at 07:13

## 2017-12-06 RX ADMIN — GLYCOPYRROLATE 0.6 MG: 0.2 INJECTION, SOLUTION INTRAMUSCULAR; INTRAVENOUS at 08:45

## 2017-12-06 RX ADMIN — DEXAMETHASONE SODIUM PHOSPHATE 8 MG: 4 INJECTION, SOLUTION INTRAMUSCULAR; INTRAVENOUS at 08:00

## 2017-12-06 RX ADMIN — MIDAZOLAM 2 MG: 1 INJECTION INTRAMUSCULAR; INTRAVENOUS at 07:38

## 2017-12-06 RX ADMIN — NEOSTIGMINE METHYLSULFATE 4 MG: 1 INJECTION, SOLUTION INTRAVENOUS at 08:45

## 2017-12-06 RX ADMIN — CEFAZOLIN SODIUM 2 G: 2 SOLUTION INTRAVENOUS at 07:48

## 2017-12-06 RX ADMIN — HYDROMORPHONE HYDROCHLORIDE 0.25 MG: 1 INJECTION, SOLUTION INTRAMUSCULAR; INTRAVENOUS; SUBCUTANEOUS at 09:29

## 2017-12-06 RX ADMIN — ONDANSETRON 4 MG: 2 INJECTION INTRAMUSCULAR; INTRAVENOUS at 08:00

## 2017-12-06 RX ADMIN — ROCURONIUM BROMIDE 50 MG: 10 INJECTION INTRAVENOUS at 07:40

## 2017-12-06 RX ADMIN — HYDROMORPHONE HYDROCHLORIDE 0.25 MG: 1 INJECTION, SOLUTION INTRAMUSCULAR; INTRAVENOUS; SUBCUTANEOUS at 09:34

## 2017-12-06 RX ADMIN — FENTANYL CITRATE 50 MCG: 50 INJECTION, SOLUTION INTRAMUSCULAR; INTRAVENOUS at 07:40

## 2017-12-06 ASSESSMENT — PULMONARY FUNCTION TESTS
PIF_VALUE: 21
PIF_VALUE: 19
PIF_VALUE: 15
PIF_VALUE: 20
PIF_VALUE: 21
PIF_VALUE: 1
PIF_VALUE: 13
PIF_VALUE: 22
PIF_VALUE: 14
PIF_VALUE: 15
PIF_VALUE: 19
PIF_VALUE: 15
PIF_VALUE: 19
PIF_VALUE: 22
PIF_VALUE: 12
PIF_VALUE: 19
PIF_VALUE: 21
PIF_VALUE: 15
PIF_VALUE: 16
PIF_VALUE: 23
PIF_VALUE: 20
PIF_VALUE: 22
PIF_VALUE: 22
PIF_VALUE: 13
PIF_VALUE: 20
PIF_VALUE: 16
PIF_VALUE: 22
PIF_VALUE: 19
PIF_VALUE: 22
PIF_VALUE: 21
PIF_VALUE: 21
PIF_VALUE: 13
PIF_VALUE: 19
PIF_VALUE: 13
PIF_VALUE: 6
PIF_VALUE: 22
PIF_VALUE: 15
PIF_VALUE: 15
PIF_VALUE: 21
PIF_VALUE: 22
PIF_VALUE: 22
PIF_VALUE: 17
PIF_VALUE: 20
PIF_VALUE: 19
PIF_VALUE: 16
PIF_VALUE: 19
PIF_VALUE: 16
PIF_VALUE: 23
PIF_VALUE: 19
PIF_VALUE: 13
PIF_VALUE: 19
PIF_VALUE: 15
PIF_VALUE: 22
PIF_VALUE: 19
PIF_VALUE: 13
PIF_VALUE: 11
PIF_VALUE: 13
PIF_VALUE: 13
PIF_VALUE: 21
PIF_VALUE: 22
PIF_VALUE: 21
PIF_VALUE: 22
PIF_VALUE: 18
PIF_VALUE: 15
PIF_VALUE: 21
PIF_VALUE: 12
PIF_VALUE: 13
PIF_VALUE: 21
PIF_VALUE: 21
PIF_VALUE: 16
PIF_VALUE: 21
PIF_VALUE: 1
PIF_VALUE: 19
PIF_VALUE: 15
PIF_VALUE: 19
PIF_VALUE: 0
PIF_VALUE: 21
PIF_VALUE: 3
PIF_VALUE: 20
PIF_VALUE: 20

## 2017-12-06 ASSESSMENT — PAIN DESCRIPTION - LOCATION
LOCATION: GROIN
LOCATION: ABDOMEN

## 2017-12-06 ASSESSMENT — PAIN DESCRIPTION - PROGRESSION
CLINICAL_PROGRESSION: NOT CHANGED
CLINICAL_PROGRESSION: GRADUALLY WORSENING
CLINICAL_PROGRESSION: NOT CHANGED

## 2017-12-06 ASSESSMENT — PAIN DESCRIPTION - PAIN TYPE
TYPE: SURGICAL PAIN

## 2017-12-06 ASSESSMENT — PAIN SCALES - GENERAL
PAINLEVEL_OUTOF10: 0
PAINLEVEL_OUTOF10: 6
PAINLEVEL_OUTOF10: 4
PAINLEVEL_OUTOF10: 5
PAINLEVEL_OUTOF10: 0
PAINLEVEL_OUTOF10: 6
PAINLEVEL_OUTOF10: 5
PAINLEVEL_OUTOF10: 6

## 2017-12-06 ASSESSMENT — PAIN DESCRIPTION - ONSET
ONSET: ON-GOING

## 2017-12-06 ASSESSMENT — PAIN DESCRIPTION - ORIENTATION: ORIENTATION: LEFT

## 2017-12-06 ASSESSMENT — PAIN SCALES - WONG BAKER: WONGBAKER_NUMERICALRESPONSE: 0

## 2017-12-06 NOTE — OP NOTE
patient's right side. Instruments were inserted and I retired to the console to complete the  dissection. The peritoneum was opened just medial to the inferior  epigastric vessels and the preperitoneal plane was developed aided by the  pneumoperitoneum. Hernia sac was reduced away from the easily visualized  cord structures and the incarcerated omentum was adherent to the peritoneum  and this was taken down as well. Shahsi's ligament was identified and the  entire inguinal floor had been cleared in preparation to place appropriate  mesh. Appropriate mesh was cut to appropriate size. It was placed  intra-abdominally into the lower left pelvic wall to complete the repair. It was tacked superior medially and inferior medially with Ethibond sutures  to the Shashi's ligament to the abdominal wall and out superolaterally with  a single Ethibond suture. The defect in the peritoneum was then closed  with running 2-0 V-Loc suture. The repair looked good. There was no  evidence of any bleeding. Instruments were removed. Robot was undocked. Umbilical fascia was closed with 0 Vicryl suture and all skin incisions  were closed in a subcuticular fashion. They were infiltrated locally with  0.5% Marcaine with epinephrine. Abdominal binder was applied. The patient  was spontaneously breathing and transported to the recovery area in stable  condition.         Alisha Carnes M.D.    D: 12/06/2017 8:51:37       T: 12/06/2017 10:04:21     [email or web address]  Job#: [id number]     Doc#: [id number]    CC:  Sanya Lund M.D.

## 2017-12-06 NOTE — PROGRESS NOTES
Patient admitted to [facility] room 14. Fall and allergy bands placed. Lee SCDS on. Patient and family oriented to room and call light.  Pain goal after surgery is 5/10

## 2017-12-06 NOTE — ANESTHESIA POSTPROCEDURE EVALUATION
Department of Anesthesiology  Postprocedure Note    Patient: Jackie Boswell  MRN: [id number]  YOB: 1995  Date of evaluation: 12/6/2017  Time:  10:05 AM     Procedure Summary     Date:  12/06/17 Room / Location:  [address] [facility] [place]    Anesthesia Start:  3302 Anesthesia Stop:  9090    Procedure:  ROBOT LEFT INGUINAL HERNIA REPAIR (Left Abdomen) Diagnosis:  (LEFT INGUINAL HERNIA)    Surgeon:  Ines Hernandez MD Responsible Provider:  Eddie Knight DO    Anesthesia Type:  general ASA Status:  2          Anesthesia Type: No value filed. David Phase I: David Score: 8    David Phase II:      Last vitals: Reviewed and per EMR flowsheets.        Anesthesia Post Evaluation    Patient location during evaluation: bedside  Patient participation: complete - patient participated  Level of consciousness: awake and alert  Pain score: 2  Airway patency: patent  Nausea & Vomiting: no nausea and no vomiting  Complications: no  Cardiovascular status: hemodynamically stable and blood pressure returned to baseline  Respiratory status: spontaneous ventilation, room air and acceptable  Hydration status: stable

## 2017-12-06 NOTE — PROGRESS NOTES
Discharge instructions discussed with the patient and family. Verbalized understanding. Discharge complete. Off unit by w/c to car.
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
## 2017-12-06 NOTE — H&P
[address] [address]  History and Physical Update    Pt Name: Jose Ulloa  MRN: [id number]  YOB: 1995  Date of evaluation: 12/6/2017    [x] I have examined the patient and reviewed the H&P/Consult and there are no changes to the patient or plans.     [] I have examined the patient and reviewed the H&P/Consult and have noted the following changes:        Ralph Cummings MD  Electronically signed 12/6/2017 at 7:23 AM

## 2017-12-07 ENCOUNTER — TELEPHONE (OUTPATIENT)
Dept: SURGERY | Age: 22
End: 2017-12-07

## 2017-12-28 ENCOUNTER — OFFICE VISIT (OUTPATIENT)
Dept: SURGERY | Age: 22
End: 2017-12-28

## 2017-12-28 VITALS
TEMPERATURE: 96.9 F | BODY MASS INDEX: 24.11 KG/M2 | RESPIRATION RATE: 16 BRPM | WEIGHT: 168 LBS | SYSTOLIC BLOOD PRESSURE: 118 MMHG | HEART RATE: 66 BPM | DIASTOLIC BLOOD PRESSURE: 76 MMHG

## 2017-12-28 DIAGNOSIS — K40.90 LEFT INGUINAL HERNIA: Primary | ICD-10-CM

## 2017-12-28 DIAGNOSIS — Z98.890 POSTOPERATIVE STATE: ICD-10-CM

## 2017-12-28 PROCEDURE — 99024 POSTOP FOLLOW-UP VISIT: CPT | Performed by: SURGERY

## 2017-12-28 NOTE — PROGRESS NOTES
Tamela Lagunas MD   General Surgery  Postprocedure Evaluation in Office  Pt Name: Annalise Gutierrez  Date of Birth 1995   Today's Date: 12/28/2017  Medical Record Number: [id number]  Primary Care Provider: Rehana Calixto MD  Chief Complaint   Patient presents with    Post-Op Check     s/p  Robotic-assisted laparoscopic repair of left inguinal hernia-12/6/17       ASSESSMENT      1. Left inguinal hernia    2. Postoperative state         PLANS       Follow-up surgical clinic as needed  Lifting restrictions: No lifting > 30 lbs for 2-4 weeks  Follow up: As needed call for any questions      Seven Hagen is seen today for post-op follow-up. He is 3 week(s) status post Left inguinal hernia repair. He is tolerating a regular diet, having regular bowel movements. Symptoms and activity have been well-controlled compared to preoperative. The surgical site is clean, dry and no drainage and has no drainage. Pain is controlled without any medications. . He has been compliant with postoperative instructions. Medications  No current outpatient prescriptions on file. Allergies    Allergies   Allergen Reactions    Morphine Hives       Review of Systems  History obtained from the patient. Constitutional: Denies any fever, chills, fatigue. Wound: Denies any rash, skin color changes or wound problems. Resp: Denies any cough, shortness of breath. CV: Denies any chest pain, orthopnea or syncope. GI: Positive for incisional discomfort only. Denies any nausea, vomiting, blood in the stool, constipation or diarrhea. : Denies any hematuria, hesitancy or dysuria. OBJECTIVE     VITALS: /76 (Site: Right Arm, Position: Sitting, Cuff Size: Medium Adult)   Pulse 66   Temp 96.9 °F (36.1 °C) (Tympanic)   Resp 16   Wt 168 lb (76.2 kg)   BMI 24.11 kg/m²     CONSTITUTIONAL: Alert and oriented times 3, no acute distress and cooperative to examination.   SKIN: Skin color, texture, turgor normal. No rashes or

## 2018-04-27 ENCOUNTER — APPOINTMENT (OUTPATIENT)
Dept: GENERAL RADIOLOGY | Age: 23
End: 2018-04-27
Payer: COMMERCIAL

## 2018-04-27 ENCOUNTER — HOSPITAL ENCOUNTER (EMERGENCY)
Age: 23
Discharge: HOME | End: 2018-04-27
Payer: COMMERCIAL

## 2018-04-27 VITALS
OXYGEN SATURATION: 97 % | RESPIRATION RATE: 16 BRPM | TEMPERATURE: 98 F | HEART RATE: 86 BPM | DIASTOLIC BLOOD PRESSURE: 86 MMHG | SYSTOLIC BLOOD PRESSURE: 147 MMHG

## 2018-04-27 DIAGNOSIS — S61.012A LACERATION OF LEFT THUMB WITHOUT FOREIGN BODY WITHOUT DAMAGE TO NAIL, INITIAL ENCOUNTER: Primary | ICD-10-CM

## 2018-04-27 PROCEDURE — 73130 X-RAY EXAM OF HAND: CPT

## 2018-04-27 PROCEDURE — 90471 IMMUNIZATION ADMIN: CPT | Performed by: PHYSICIAN ASSISTANT

## 2018-04-27 PROCEDURE — 90715 TDAP VACCINE 7 YRS/> IM: CPT | Performed by: PHYSICIAN ASSISTANT

## 2018-04-27 PROCEDURE — 6370000000 HC RX 637 (ALT 250 FOR IP): Performed by: PHYSICIAN ASSISTANT

## 2018-04-27 PROCEDURE — 12001 RPR S/N/AX/GEN/TRNK 2.5CM/<: CPT

## 2018-04-27 PROCEDURE — 6360000002 HC RX W HCPCS: Performed by: PHYSICIAN ASSISTANT

## 2018-04-27 PROCEDURE — 99283 EMERGENCY DEPT VISIT LOW MDM: CPT

## 2018-04-27 RX ORDER — LIDOCAINE HYDROCHLORIDE 10 MG/ML
INJECTION, SOLUTION INFILTRATION; PERINEURAL
Status: DISCONTINUED
Start: 2018-04-27 | End: 2018-04-27 | Stop reason: HOSPADM

## 2018-04-27 RX ORDER — IBUPROFEN 200 MG
TABLET ORAL 4 TIMES DAILY
Status: DISCONTINUED | OUTPATIENT
Start: 2018-04-27 | End: 2018-04-27 | Stop reason: HOSPADM

## 2018-04-27 RX ORDER — CEPHALEXIN 500 MG/1
500 CAPSULE ORAL 2 TIMES DAILY
Qty: 20 CAPSULE | Refills: 0 | Status: SHIPPED | OUTPATIENT
Start: 2018-04-27 | End: 2018-05-07

## 2018-04-27 RX ADMIN — BACITRACIN, NEOMYCIN, POLYMYXIN B 3.5 G: 400; 3.5; 5 OINTMENT TOPICAL at 15:32

## 2018-04-27 RX ADMIN — TETANUS TOXOID, REDUCED DIPHTHERIA TOXOID AND ACELLULAR PERTUSSIS VACCINE, ADSORBED 0.5 ML: 5; 2.5; 8; 8; 2.5 SUSPENSION INTRAMUSCULAR at 14:36

## 2018-04-27 ASSESSMENT — ENCOUNTER SYMPTOMS
RHINORRHEA: 0
WHEEZING: 0
SORE THROAT: 0
VOMITING: 0
ABDOMINAL PAIN: 0
NAUSEA: 0
EYE REDNESS: 0
COUGH: 0
EYE DISCHARGE: 0
DIARRHEA: 0
BACK PAIN: 0
SHORTNESS OF BREATH: 0

## 2018-04-30 ASSESSMENT — ENCOUNTER SYMPTOMS
EYE DISCHARGE: 0
ABDOMINAL PAIN: 0
EYE REDNESS: 0
RHINORRHEA: 0
SORE THROAT: 0
SHORTNESS OF BREATH: 0
BACK PAIN: 0
COUGH: 0
VOMITING: 0
DIARRHEA: 0
NAUSEA: 0
WHEEZING: 0

## 2019-04-19 ENCOUNTER — HOSPITAL ENCOUNTER (OUTPATIENT)
Dept: ULTRASOUND IMAGING | Age: 24
Discharge: HOME | End: 2019-04-19
Payer: COMMERCIAL

## 2019-04-19 DIAGNOSIS — Z00.6 EXAMINATION FOR NORMAL COMPARISON FOR CLINICAL RESEARCH: ICD-10-CM

## 2019-04-19 PROCEDURE — 3209999900 US COMPARISON OF OUTSIDE FILMS

## 2019-04-22 ENCOUNTER — OFFICE VISIT (OUTPATIENT)
Dept: UROLOGY | Age: 24
End: 2019-04-22
Payer: COMMERCIAL

## 2019-04-22 VITALS
WEIGHT: 189.1 LBS | SYSTOLIC BLOOD PRESSURE: 124 MMHG | HEIGHT: 73 IN | DIASTOLIC BLOOD PRESSURE: 82 MMHG | BODY MASS INDEX: 25.06 KG/M2

## 2019-04-22 DIAGNOSIS — I86.1 VARICOCELE: Primary | ICD-10-CM

## 2019-04-22 LAB
BILIRUBIN URINE: ABNORMAL
BLOOD URINE, POC: NEGATIVE
CHARACTER, URINE: CLEAR
COLOR, URINE: YELLOW
GLUCOSE URINE: NEGATIVE MG/DL
KETONES, URINE: ABNORMAL
LEUKOCYTE CLUMPS, URINE: NEGATIVE
NITRITE, URINE: NEGATIVE
PH, URINE: 6 (ref 5–9)
PROTEIN, URINE: NEGATIVE MG/DL
SPECIFIC GRAVITY, URINE: >= 1.03 (ref 1–1.03)
UROBILINOGEN, URINE: 0.2 EU/DL (ref 0–1)

## 2019-04-22 PROCEDURE — G8428 CUR MEDS NOT DOCUMENT: HCPCS | Performed by: UROLOGY

## 2019-04-22 PROCEDURE — G8420 CALC BMI NORM PARAMETERS: HCPCS | Performed by: UROLOGY

## 2019-04-22 PROCEDURE — 99244 OFF/OP CNSLTJ NEW/EST MOD 40: CPT | Performed by: UROLOGY

## 2019-04-22 ASSESSMENT — ENCOUNTER SYMPTOMS
DIARRHEA: 0
CONSTIPATION: 0
CHEST TIGHTNESS: 0
EYE REDNESS: 0
EYE PAIN: 0
BACK PAIN: 0
SHORTNESS OF BREATH: 0
ABDOMINAL PAIN: 0
EYE ITCHING: 0
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
## 2019-04-22 NOTE — PATIENT INSTRUCTIONS
Patient Education        Learning About a Varicocele  What is a varicocele? A varicocele is an enlarged vein in the scrotum. The scrotum contains the testicles. A varicocele can occur in either testicle, but it most often affects the left one. It may cause some discomfort. In some cases, it may look like a \"bag of worms\" or feel like one to the touch. What causes it? A varicocele may form in one or both testicles when there is increased pressure in the veins of the scrotum. This pressure is more likely to build up in the left testicle. That's because of how the veins on the left side are connected to the rest of the body's blood vessels. What are the symptoms? A varicocele may not cause any symptoms. But you may feel an ache in your scrotum. You may notice that your scrotum is larger or hangs lower. Or you may see or feel enlarged veins in it. Some men with a varicocele have a smaller testicle or are infertile. How is it diagnosed? Your doctor may feel the varicocele when checking your scrotum. You may get an ultrasound or CT scan of the scrotum to look at the size and shape of the testicles. These scans can also make sure that the larger scrotum is a varicocele and not something else. If you are infertile, your doctor may check to see if a varicocele could be the cause. How is it treated? Most varicoceles don't need treatment. And most don't cause problems. But if your testicle is too small, the varicocele is large, your sperm count is too low, or you have pain, your doctor may recommend a procedure or surgery to close or tie off the vein in the scrotum so pressure doesn't build up. Follow-up care is a key part of your treatment and safety. Be sure to make and go to all appointments, and call your doctor if you are having problems. It's also a good idea to know your test results and keep a list of the medicines you take. Where can you learn more? Go to https://[facility]. org and sign in to your [employer] account. Enter [id number] [phone] in the [facility] box to learn more about \"Learning About a Varicocele. \"     If you do not have an account, please click on the \"Sign Up Now\" link. Current as of: March 20, 2018  Content Version: 11.9  © 5059-6056 [employer]. Care instructions adapted under license by [facility] ([facility]). If you have questions about a medical condition or this instruction, always ask your healthcare professional. [address] any warranty or liability for your use of this information.

## (undated) DEVICE — CANNULA SEAL

## (undated) DEVICE — BASIC SINGLE BASIN BTC-LF: Brand: MEDLINE INDUSTRIES, INC.

## (undated) DEVICE — POSITIONER HD W8XH4XL8.5IN RASPBERRY FOAM SLT

## (undated) DEVICE — GLOVE ORANGE PI 7   MSG9070

## (undated) DEVICE — GLOVE ORANGE PI 8   MSG9080

## (undated) DEVICE — ELECTRO LUBE IS A SINGLE PATIENT USE DEVICE THAT IS INTENDED TO BE USED ON ELECTROSURGICAL ELECTRODES TO REDUCE STICKING.: Brand: KEY SURGICAL ELECTRO LUBE

## (undated) DEVICE — BINDER ABD SM MED 30 IN - 45 IN HT 12 IN

## (undated) DEVICE — SYRINGE MED 30ML STD CLR PLAS LUERLOCK TIP N CTRL DISP

## (undated) DEVICE — 3M™ WARMING BLANKET, UPPER BODY, 10 PER CASE, 42268: Brand: BAIR HUGGER™

## (undated) DEVICE — BLADELESS OBTURATOR: Brand: WECK VISTA

## (undated) DEVICE — BLADE CLIPPER GEN PURP NS

## (undated) DEVICE — 2000CC GUARDIAN II: Brand: GUARDIAN

## (undated) DEVICE — SOLUTION ANTIFOG VIS SYS CLEARIFY LAPSCP

## (undated) DEVICE — MEDI-VAC NON-CONDUCTIVE SUCTION TUBING 6MM X 6.1M (20 FT.) L: Brand: CARDINAL HEALTH

## (undated) DEVICE — INTENDED FOR TISSUE SEPARATION, AND OTHER PROCEDURES THAT REQUIRE A SHARP SURGICAL BLADE TO PUNCTURE OR CUT.: Brand: BARD-PARKER ® CARBON RIB-BACK BLADES

## (undated) DEVICE — ARM DRAPE

## (undated) DEVICE — GOWN,SIRUS,NON REINFRCD,LARGE,SET IN SL: Brand: MEDLINE

## (undated) DEVICE — COLUMN DRAPE

## (undated) DEVICE — TIP COVER ACCESSORY

## (undated) DEVICE — KIT DRP 3 ARM ACC DISP ENDOWRIST DA VINCI SI

## (undated) DEVICE — SKIN AFFIX SURG ADHESIVE 72/CS 0.55ML: Brand: MEDLINE

## (undated) DEVICE — MEDI-VAC YANKAUER SUCTION HANDLE W/BULBOUS TIP: Brand: CARDINAL HEALTH

## (undated) DEVICE — GENERAL LAPAROSCOPY PACK-LF: Brand: MEDLINE INDUSTRIES, INC.

## (undated) DEVICE — SOLUTION IV IRRIG WATER 1000ML POUR BRL 2F7114

## (undated) DEVICE — SUTURE V-LOC 180 SZ 3-0 L9IN ABSRB GRN L26MM V-20 1/2 CIR VLOCL0644

## (undated) DEVICE — [HIGH FLOW INSUFFLATOR,  DO NOT USE IF PACKAGE IS DAMAGED,  KEEP DRY,  KEEP AWAY FROM SUNLIGHT,  PROTECT FROM HEAT AND RADIOACTIVE SOURCES.]: Brand: PNEUMOSURE